# Patient Record
Sex: FEMALE | Race: BLACK OR AFRICAN AMERICAN | Employment: UNEMPLOYED | URBAN - METROPOLITAN AREA
[De-identification: names, ages, dates, MRNs, and addresses within clinical notes are randomized per-mention and may not be internally consistent; named-entity substitution may affect disease eponyms.]

---

## 2020-12-21 ENCOUNTER — HOSPITAL ENCOUNTER (OUTPATIENT)
Facility: CLINIC | Age: 40
Setting detail: OBSERVATION
Discharge: HOME | End: 2020-12-22
Attending: EMERGENCY MEDICINE | Admitting: INTERNAL MEDICINE
Payer: MEDICAID

## 2020-12-21 DIAGNOSIS — D64.9 ANEMIA, UNSPECIFIED TYPE: ICD-10-CM

## 2020-12-21 DIAGNOSIS — J02.9 SORE THROAT: ICD-10-CM

## 2020-12-21 DIAGNOSIS — Z20.828 CONTACT WITH AND (SUSPECTED) EXPOSURE TO OTHER VIRAL COMMUNICABLE DISEASES: ICD-10-CM

## 2020-12-21 LAB
ABO + RH BLD: NORMAL
ABO + RH BLD: NORMAL
ALBUMIN SERPL-MCNC: 3.3 G/DL (ref 3.4–5)
ALBUMIN UR-MCNC: NEGATIVE MG/DL
ALP SERPL-CCNC: 61 U/L (ref 40–150)
ALT SERPL W P-5'-P-CCNC: 16 U/L (ref 0–50)
ANION GAP SERPL CALCULATED.3IONS-SCNC: 6 MMOL/L (ref 3–14)
ANISOCYTOSIS BLD QL SMEAR: ABNORMAL
APPEARANCE UR: CLEAR
APTT PPP: 33 SEC (ref 22–37)
AST SERPL W P-5'-P-CCNC: 33 U/L (ref 0–45)
BACTERIA #/AREA URNS HPF: ABNORMAL /HPF
BASOPHILS # BLD AUTO: 0.1 10E9/L (ref 0–0.2)
BASOPHILS NFR BLD AUTO: 1.7 %
BILIRUB SERPL-MCNC: 0.6 MG/DL (ref 0.2–1.3)
BILIRUB UR QL STRIP: NEGATIVE
BLD GP AB SCN SERPL QL: NORMAL
BLD PROD TYP BPU: NORMAL
BLD UNIT ID BPU: 0
BLD UNIT ID BPU: 0
BLOOD BANK CMNT PATIENT-IMP: NORMAL
BLOOD PRODUCT CODE: NORMAL
BLOOD PRODUCT CODE: NORMAL
BPU ID: NORMAL
BPU ID: NORMAL
BUN SERPL-MCNC: 5 MG/DL (ref 7–30)
CALCIUM SERPL-MCNC: 8.2 MG/DL (ref 8.5–10.1)
CHLORIDE SERPL-SCNC: 105 MMOL/L (ref 94–109)
CO2 SERPL-SCNC: 25 MMOL/L (ref 20–32)
COLOR UR AUTO: ABNORMAL
CREAT SERPL-MCNC: 0.38 MG/DL (ref 0.52–1.04)
DACRYOCYTES BLD QL SMEAR: ABNORMAL
DEPRECATED S PYO AG THROAT QL EIA: NEGATIVE
DIFFERENTIAL METHOD BLD: ABNORMAL
DIFFERENTIAL METHOD BLD: ABNORMAL
ELLIPTOCYTES BLD QL SMEAR: ABNORMAL
EOSINOPHIL # BLD AUTO: 0 10E9/L (ref 0–0.7)
EOSINOPHIL # BLD AUTO: 0.1 10E9/L (ref 0–0.7)
EOSINOPHIL NFR BLD AUTO: 0.9 %
EOSINOPHIL NFR BLD AUTO: 2 %
ERYTHROCYTE [DISTWIDTH] IN BLOOD BY AUTOMATED COUNT: 22.6 % (ref 10–15)
ERYTHROCYTE [DISTWIDTH] IN BLOOD BY AUTOMATED COUNT: 23 % (ref 10–15)
FERRITIN SERPL-MCNC: 2 NG/ML (ref 12–150)
FLUABV+SARS-COV-2+RSV PNL RESP NAA+PROBE: NEGATIVE
FLUABV+SARS-COV-2+RSV PNL RESP NAA+PROBE: NEGATIVE
FOLATE SERPL-MCNC: 6.7 NG/ML
GFR SERPL CREATININE-BSD FRML MDRD: >90 ML/MIN/{1.73_M2}
GLUCOSE SERPL-MCNC: 93 MG/DL (ref 70–99)
GLUCOSE UR STRIP-MCNC: NEGATIVE MG/DL
HCG SERPL QL: NEGATIVE
HCT VFR BLD AUTO: 20.7 % (ref 35–47)
HCT VFR BLD AUTO: 21.9 % (ref 35–47)
HGB BLD-MCNC: 5.1 G/DL (ref 11.7–15.7)
HGB BLD-MCNC: 5.4 G/DL (ref 11.7–15.7)
HGB UR QL STRIP: NEGATIVE
HYPOCHROMIA BLD QL: PRESENT
INR PPP: 1.23 (ref 0.86–1.14)
IRON SATN MFR SERPL: 2 % (ref 15–46)
IRON SERPL-MCNC: 7 UG/DL (ref 35–180)
KETONES UR STRIP-MCNC: NEGATIVE MG/DL
LABORATORY COMMENT REPORT: NORMAL
LDH SERPL L TO P-CCNC: 156 U/L (ref 81–234)
LEUKOCYTE ESTERASE UR QL STRIP: NEGATIVE
LYMPHOCYTES # BLD AUTO: 1.6 10E9/L (ref 0.8–5.3)
LYMPHOCYTES # BLD AUTO: 2 10E9/L (ref 0.8–5.3)
LYMPHOCYTES NFR BLD AUTO: 44 %
LYMPHOCYTES NFR BLD AUTO: 44.4 %
MCH RBC QN AUTO: 15.4 PG (ref 26.5–33)
MCH RBC QN AUTO: 15.4 PG (ref 26.5–33)
MCHC RBC AUTO-ENTMCNC: 24.6 G/DL (ref 31.5–36.5)
MCHC RBC AUTO-ENTMCNC: 24.7 G/DL (ref 31.5–36.5)
MCV RBC AUTO: 62 FL (ref 78–100)
MCV RBC AUTO: 62 FL (ref 78–100)
MICROCYTES BLD QL SMEAR: PRESENT
MONOCYTES # BLD AUTO: 0.4 10E9/L (ref 0–1.3)
MONOCYTES # BLD AUTO: 0.4 10E9/L (ref 0–1.3)
MONOCYTES NFR BLD AUTO: 11 %
MONOCYTES NFR BLD AUTO: 8.7 %
MYELOCYTES # BLD: 0.1 10E9/L
MYELOCYTES NFR BLD MANUAL: 1.7 %
NEUTROPHILS # BLD AUTO: 1.6 10E9/L (ref 1.6–8.3)
NEUTROPHILS # BLD AUTO: 2 10E9/L (ref 1.6–8.3)
NEUTROPHILS NFR BLD AUTO: 42.6 %
NEUTROPHILS NFR BLD AUTO: 43 %
NITRATE UR QL: NEGATIVE
NRBC # BLD AUTO: 0 10*3/UL
NRBC BLD AUTO-RTO: 1 /100
NUM BPU REQUESTED: 2
PH UR STRIP: 5.5 PH (ref 5–7)
PLATELET # BLD AUTO: 273 10E9/L (ref 150–450)
PLATELET # BLD AUTO: 320 10E9/L (ref 150–450)
PLATELET # BLD EST: ABNORMAL 10*3/UL
POIKILOCYTOSIS BLD QL SMEAR: ABNORMAL
POTASSIUM SERPL-SCNC: 4.1 MMOL/L (ref 3.4–5.3)
PROT SERPL-MCNC: 9.9 G/DL (ref 6.8–8.8)
RBC # BLD AUTO: 3.32 10E12/L (ref 3.8–5.2)
RBC # BLD AUTO: 3.51 10E12/L (ref 3.8–5.2)
RBC #/AREA URNS AUTO: 5 /HPF (ref 0–2)
RBC INCLUSIONS BLD: SLIGHT
RETICS # AUTO: 39.8 10E9/L (ref 25–95)
RETICS/RBC NFR AUTO: 1.2 % (ref 0.5–2)
RSV RNA SPEC QL NAA+PROBE: NORMAL
SARS-COV-2 RNA SPEC QL NAA+PROBE: NEGATIVE
SODIUM SERPL-SCNC: 136 MMOL/L (ref 133–144)
SOURCE: ABNORMAL
SP GR UR STRIP: 1 (ref 1–1.03)
SPECIMEN EXP DATE BLD: NORMAL
SPECIMEN SOURCE: NORMAL
SPERM #/AREA URNS HPF: PRESENT /HPF
SQUAMOUS #/AREA URNS AUTO: 6 /HPF (ref 0–1)
STREP GROUP A PCR: NOT DETECTED
TIBC SERPL-MCNC: 371 UG/DL (ref 240–430)
TRANSFERRIN SERPL-MCNC: 278 MG/DL (ref 210–360)
TRANSFUSION STATUS PATIENT QL: NORMAL
TSH SERPL DL<=0.005 MIU/L-ACNC: 1.43 MU/L (ref 0.4–4)
UROBILINOGEN UR STRIP-MCNC: NORMAL MG/DL (ref 0–2)
VIT B12 SERPL-MCNC: 308 PG/ML (ref 193–986)
WBC # BLD AUTO: 3.7 10E9/L (ref 4–11)
WBC # BLD AUTO: 4.6 10E9/L (ref 4–11)
WBC #/AREA URNS AUTO: 1 /HPF (ref 0–5)

## 2020-12-21 PROCEDURE — P9016 RBC LEUKOCYTES REDUCED: HCPCS | Performed by: EMERGENCY MEDICINE

## 2020-12-21 PROCEDURE — 99285 EMERGENCY DEPT VISIT HI MDM: CPT | Performed by: EMERGENCY MEDICINE

## 2020-12-21 PROCEDURE — 85610 PROTHROMBIN TIME: CPT | Performed by: EMERGENCY MEDICINE

## 2020-12-21 PROCEDURE — 93010 ELECTROCARDIOGRAM REPORT: CPT | Performed by: EMERGENCY MEDICINE

## 2020-12-21 PROCEDURE — 86900 BLOOD TYPING SEROLOGIC ABO: CPT | Performed by: EMERGENCY MEDICINE

## 2020-12-21 PROCEDURE — 999N001174 HC STATISTIC STREP A RAPID: Performed by: EMERGENCY MEDICINE

## 2020-12-21 PROCEDURE — 85060 BLOOD SMEAR INTERPRETATION: CPT | Performed by: PATHOLOGY

## 2020-12-21 PROCEDURE — 96361 HYDRATE IV INFUSION ADD-ON: CPT | Performed by: EMERGENCY MEDICINE

## 2020-12-21 PROCEDURE — 84466 ASSAY OF TRANSFERRIN: CPT | Performed by: EMERGENCY MEDICINE

## 2020-12-21 PROCEDURE — 83010 ASSAY OF HAPTOGLOBIN QUANT: CPT | Performed by: EMERGENCY MEDICINE

## 2020-12-21 PROCEDURE — 85730 THROMBOPLASTIN TIME PARTIAL: CPT | Performed by: EMERGENCY MEDICINE

## 2020-12-21 PROCEDURE — C9803 HOPD COVID-19 SPEC COLLECT: HCPCS | Performed by: EMERGENCY MEDICINE

## 2020-12-21 PROCEDURE — G0378 HOSPITAL OBSERVATION PER HR: HCPCS

## 2020-12-21 PROCEDURE — 82746 ASSAY OF FOLIC ACID SERUM: CPT | Performed by: EMERGENCY MEDICINE

## 2020-12-21 PROCEDURE — 250N000013 HC RX MED GY IP 250 OP 250 PS 637: Performed by: EMERGENCY MEDICINE

## 2020-12-21 PROCEDURE — 99219 PR INITIAL OBSERVATION CARE,LEVEL II: CPT | Performed by: INTERNAL MEDICINE

## 2020-12-21 PROCEDURE — 83540 ASSAY OF IRON: CPT | Performed by: EMERGENCY MEDICINE

## 2020-12-21 PROCEDURE — 999N001086 HC STATISTIC MORPHOLOGY W/INTERP HEMEPATH TC 85060: Performed by: EMERGENCY MEDICINE

## 2020-12-21 PROCEDURE — 83615 LACTATE (LD) (LDH) ENZYME: CPT | Performed by: EMERGENCY MEDICINE

## 2020-12-21 PROCEDURE — 86901 BLOOD TYPING SEROLOGIC RH(D): CPT | Performed by: EMERGENCY MEDICINE

## 2020-12-21 PROCEDURE — 86923 COMPATIBILITY TEST ELECTRIC: CPT | Performed by: EMERGENCY MEDICINE

## 2020-12-21 PROCEDURE — 85025 COMPLETE CBC W/AUTO DIFF WBC: CPT | Mod: 91 | Performed by: EMERGENCY MEDICINE

## 2020-12-21 PROCEDURE — 84443 ASSAY THYROID STIM HORMONE: CPT | Performed by: EMERGENCY MEDICINE

## 2020-12-21 PROCEDURE — 93005 ELECTROCARDIOGRAM TRACING: CPT | Performed by: EMERGENCY MEDICINE

## 2020-12-21 PROCEDURE — 82728 ASSAY OF FERRITIN: CPT | Performed by: EMERGENCY MEDICINE

## 2020-12-21 PROCEDURE — 83550 IRON BINDING TEST: CPT | Performed by: EMERGENCY MEDICINE

## 2020-12-21 PROCEDURE — 87636 SARSCOV2 & INF A&B AMP PRB: CPT | Performed by: EMERGENCY MEDICINE

## 2020-12-21 PROCEDURE — 96360 HYDRATION IV INFUSION INIT: CPT | Performed by: EMERGENCY MEDICINE

## 2020-12-21 PROCEDURE — 99285 EMERGENCY DEPT VISIT HI MDM: CPT | Mod: 25 | Performed by: EMERGENCY MEDICINE

## 2020-12-21 PROCEDURE — 250N000009 HC RX 250: Performed by: EMERGENCY MEDICINE

## 2020-12-21 PROCEDURE — 84134 ASSAY OF PREALBUMIN: CPT | Performed by: EMERGENCY MEDICINE

## 2020-12-21 PROCEDURE — 86850 RBC ANTIBODY SCREEN: CPT | Performed by: EMERGENCY MEDICINE

## 2020-12-21 PROCEDURE — 87651 STREP A DNA AMP PROBE: CPT | Performed by: EMERGENCY MEDICINE

## 2020-12-21 PROCEDURE — 258N000003 HC RX IP 258 OP 636: Performed by: EMERGENCY MEDICINE

## 2020-12-21 PROCEDURE — 82607 VITAMIN B-12: CPT | Performed by: EMERGENCY MEDICINE

## 2020-12-21 PROCEDURE — 85045 AUTOMATED RETICULOCYTE COUNT: CPT | Performed by: EMERGENCY MEDICINE

## 2020-12-21 PROCEDURE — 80053 COMPREHEN METABOLIC PANEL: CPT | Performed by: EMERGENCY MEDICINE

## 2020-12-21 PROCEDURE — 81001 URINALYSIS AUTO W/SCOPE: CPT | Performed by: EMERGENCY MEDICINE

## 2020-12-21 PROCEDURE — 36430 TRANSFUSION BLD/BLD COMPNT: CPT | Performed by: EMERGENCY MEDICINE

## 2020-12-21 PROCEDURE — 84703 CHORIONIC GONADOTROPIN ASSAY: CPT | Performed by: EMERGENCY MEDICINE

## 2020-12-21 RX ORDER — POLYETHYLENE GLYCOL 3350 17 G/17G
17 POWDER, FOR SOLUTION ORAL DAILY
Status: DISCONTINUED | OUTPATIENT
Start: 2020-12-22 | End: 2020-12-22 | Stop reason: HOSPADM

## 2020-12-21 RX ORDER — SODIUM CHLORIDE 9 MG/ML
INJECTION, SOLUTION INTRAVENOUS
Status: DISCONTINUED
Start: 2020-12-21 | End: 2020-12-21 | Stop reason: HOSPADM

## 2020-12-21 RX ORDER — ACETAMINOPHEN 325 MG/1
650 TABLET ORAL EVERY 6 HOURS PRN
Status: ON HOLD | COMMUNITY
End: 2020-12-22

## 2020-12-21 RX ORDER — ACETAMINOPHEN 650 MG/1
650 SUPPOSITORY RECTAL EVERY 4 HOURS PRN
Status: DISCONTINUED | OUTPATIENT
Start: 2020-12-21 | End: 2020-12-22 | Stop reason: HOSPADM

## 2020-12-21 RX ORDER — DEXTROSE MONOHYDRATE, SODIUM CHLORIDE, AND POTASSIUM CHLORIDE 50; 1.49; 9 G/1000ML; G/1000ML; G/1000ML
INJECTION, SOLUTION INTRAVENOUS CONTINUOUS
Status: DISPENSED | OUTPATIENT
Start: 2020-12-21 | End: 2020-12-22

## 2020-12-21 RX ORDER — SODIUM CHLORIDE 9 MG/ML
INJECTION, SOLUTION INTRAVENOUS CONTINUOUS
Status: DISCONTINUED | OUTPATIENT
Start: 2020-12-21 | End: 2020-12-22 | Stop reason: HOSPADM

## 2020-12-21 RX ORDER — ACETAMINOPHEN 325 MG/1
650 TABLET ORAL EVERY 4 HOURS PRN
Status: DISCONTINUED | OUTPATIENT
Start: 2020-12-21 | End: 2020-12-22 | Stop reason: HOSPADM

## 2020-12-21 RX ORDER — ONDANSETRON 4 MG/1
4 TABLET, ORALLY DISINTEGRATING ORAL EVERY 6 HOURS PRN
Status: DISCONTINUED | OUTPATIENT
Start: 2020-12-21 | End: 2020-12-22 | Stop reason: HOSPADM

## 2020-12-21 RX ORDER — ACETAMINOPHEN 500 MG
1000 TABLET ORAL ONCE
Status: COMPLETED | OUTPATIENT
Start: 2020-12-21 | End: 2020-12-21

## 2020-12-21 RX ORDER — AMOXICILLIN 250 MG
2 CAPSULE ORAL 2 TIMES DAILY
Status: DISCONTINUED | OUTPATIENT
Start: 2020-12-21 | End: 2020-12-22 | Stop reason: HOSPADM

## 2020-12-21 RX ORDER — ACETAMINOPHEN 325 MG/1
650 TABLET ORAL EVERY 6 HOURS PRN
Status: DISCONTINUED | OUTPATIENT
Start: 2020-12-21 | End: 2020-12-21

## 2020-12-21 RX ORDER — AMOXICILLIN 250 MG
1 CAPSULE ORAL 2 TIMES DAILY
Status: DISCONTINUED | OUTPATIENT
Start: 2020-12-21 | End: 2020-12-22 | Stop reason: HOSPADM

## 2020-12-21 RX ORDER — ONDANSETRON 2 MG/ML
4 INJECTION INTRAMUSCULAR; INTRAVENOUS EVERY 6 HOURS PRN
Status: DISCONTINUED | OUTPATIENT
Start: 2020-12-21 | End: 2020-12-22 | Stop reason: HOSPADM

## 2020-12-21 RX ADMIN — ACETAMINOPHEN 1000 MG: 500 TABLET ORAL at 14:49

## 2020-12-21 RX ADMIN — SODIUM CHLORIDE 1000 ML: 9 INJECTION, SOLUTION INTRAVENOUS at 15:53

## 2020-12-21 RX ADMIN — SODIUM CHLORIDE: 9 INJECTION, SOLUTION INTRAVENOUS at 18:32

## 2020-12-21 RX ADMIN — LIDOCAINE HYDROCHLORIDE 15 ML: 20 SOLUTION ORAL; TOPICAL at 14:49

## 2020-12-21 ASSESSMENT — MIFFLIN-ST. JEOR: SCORE: 1125.41

## 2020-12-21 NOTE — PHARMACY-ADMISSION MEDICATION HISTORY
Admission medication history interview status for the 12/21/2020 admission is complete. See Epic admission navigator for allergy information, pharmacy, prior to admission medications and immunization status.     Medication history interview sources:  Patient interview    Changes made to PTA medication list (reason)  Added: Tylenol PRN  Deleted: None  Changed: None    Additional medication history information (including reliability of information, actions taken by pharmacist): None    Prior to Admission medications    Medication Sig Last Dose Taking? Auth Provider   acetaminophen (TYLENOL) 325 MG tablet Take 650 mg by mouth every 6 hours as needed for mild pain 12/20/2020 Yes Unknown, Entered By History     Medication history completed by:   Lorena Khan, PharmD, BCPS  Clinical Pharmacist - ED, Ascom *[id number]

## 2020-12-21 NOTE — ED PROVIDER NOTES
"    [facility] [place] EMERGENCY DEPARTMENT ([facility])   12/21/20      History     Chief Complaint   Patient presents with     Pharyngitis     Chronic intermittent sore throat x 6 months, patient clims its difficult to swallow, subjective fever     HPI  Kajal Allen is a 40 year old female who presents to the Emergency Department for evaluation of a sore throat.  Patient states that this sore throat has been ongoing for 6 months, and \"comes and goes\".  She is unsure how long this most recent episode has been ongoing for.  The patient also endorses headache, and subjective fevers.  Patient states that she has been feeling feverish for 1 day.  She also endorses pain with swallowing that has been ongoing for 3 months.  She states that she has been attempting to drink plenty of water, and eat soft food.  Patient states that she took Tylenol yesterday, but none today before coming in for evaluation.  Patient states that she has lost her sense of taste whenever she eats food.  She states this has been ongoing for 4-5 days.  Patient denies any exposure to COVID-19. She lives alone and reports she is not currently working.  She denies any abdominal pain, nausea, vomiting, or diarrhea.  Denies any dental pain. No cough.  Patient states that she has been fatigued over the past 1 week as well. She states she has heavy menstrual periods with last being 10 days ago. She denies any other bleeding, no hematochezia or melena. No current vaginal bleeding. No dysuria, hematuria, chest pain, or shortness of breath. No recent falls or trauma. She denies being on any anticoagulation.     I have reviewed the Medications, Allergies, Past Medical and Surgical History, and Social History in the [employer] system.  PAST MEDICAL HISTORY: History reviewed. No pertinent past medical history.    PAST SURGICAL HISTORY: History reviewed. No pertinent surgical history.    Past medical history, past surgical history, medications, and allergies were reviewed " "with the patient. Additional pertinent items: None    FAMILY HISTORY: History reviewed. No pertinent family history.    SOCIAL HISTORY:   Social History     Tobacco Use     Smoking status: Never Smoker     Smokeless tobacco: Never Used   Substance Use Topics     Alcohol use: Not Currently     Social history was reviewed with the patient. Additional pertinent items: None      Patient's Medications    No medications on file        No Known Allergies     Review of Systems  Pertinent positives and negatives are documented in the HPI. All other systems reviewed and are negative.    Physical Exam   BP: 120/74  Pulse: 104  Temp: 97.7  F (36.5  C)  Resp: 16  Height: 154.9 cm (5' 1\")  Weight: 50.8 kg (112 lb)  SpO2: 97 %      Physical Exam  Vitals signs and nursing note reviewed.   Constitutional:       General: She is not in acute distress.     Appearance: She is well-developed.   HENT:      Head: Normocephalic and atraumatic.      Nose: Nose normal.      Mouth/Throat:      Mouth: Mucous membranes are moist.      Pharynx: No oropharyngeal exudate or posterior oropharyngeal erythema.      Comments: No erythema. Uvula is midline. No posterior oropharynx swelling. No trismus. Voice is normal. No exudate  Eyes:      Extraocular Movements: Extraocular movements intact.      Conjunctiva/sclera: Conjunctivae normal.      Pupils: Pupils are equal, round, and reactive to light.   Neck:      Musculoskeletal: Normal range of motion and neck supple. No neck rigidity.   Cardiovascular:      Rate and Rhythm: Regular rhythm. Tachycardia present.      Pulses: Normal pulses.      Heart sounds: Normal heart sounds. No murmur.      Comments: Mild tachycardia  Pulmonary:      Effort: Pulmonary effort is normal. No respiratory distress.      Breath sounds: Normal breath sounds. No wheezing or rales.   Abdominal:      General: Bowel sounds are normal. There is no distension.      Palpations: Abdomen is soft. There is no mass.      Tenderness: " There is no abdominal tenderness. There is no guarding or rebound.   Musculoskeletal: Normal range of motion.         General: No swelling or tenderness.   Lymphadenopathy:      Cervical: No cervical adenopathy.   Skin:     General: Skin is warm and dry.      Capillary Refill: Capillary refill takes less than 2 seconds.      Findings: No rash.   Neurological:      General: No focal deficit present.      Mental Status: She is alert and oriented to person, place, and time.      GCS: GCS eye subscore is 4. GCS verbal subscore is 5. GCS motor subscore is 6.      Cranial Nerves: No cranial nerve deficit.      Sensory: No sensory deficit.      Motor: No weakness.   Psychiatric:         Mood and Affect: Mood normal.         ED Course   2:41 PM  The patient was seen and examined by Phoebe Martinez MD in Room ED18.        Procedures                   Labs Ordered and Resulted from Time of ED Arrival Up to the Time of Departure from the ED   COMPREHENSIVE METABOLIC PANEL - Abnormal; Notable for the following components:       Result Value    Urea Nitrogen 5 (*)     Creatinine 0.38 (*)     Calcium 8.2 (*)     Albumin 3.3 (*)     Protein Total 9.9 (*)     All other components within normal limits   CBC WITH PLATELETS DIFFERENTIAL - Abnormal; Notable for the following components:    RBC Count 3.51 (*)     Hemoglobin 5.4 (*)     Hematocrit 21.9 (*)     MCV 62 (*)     MCH 15.4 (*)     MCHC 24.7 (*)     RDW 23.0 (*)     Nucleated RBCs 1 (*)     Absolute Myelocytes 0.1 (*)     All other components within normal limits   ROUTINE UA WITH MICROSCOPIC - Abnormal; Notable for the following components:    RBC Urine 5 (*)     Bacteria Urine Few (*)     Squamous Epithelial /HPF Urine 6 (*)     sperm Present (*)     All other components within normal limits   IRON AND IRON BINDING CAPACITY - Abnormal; Notable for the following components:    Iron 7 (*)     Iron Saturation Index 2 (*)     All other components within normal limits   INR -  Abnormal; Notable for the following components:    INR 1.23 (*)     All other components within normal limits   CBC WITH PLATELETS DIFFERENTIAL - Abnormal; Notable for the following components:    WBC 3.7 (*)     RBC Count 3.32 (*)     Hemoglobin 5.1 (*)     Hematocrit 20.7 (*)     MCV 62 (*)     MCH 15.4 (*)     MCHC 24.6 (*)     RDW 22.6 (*)     All other components within normal limits   FERRITIN - Abnormal; Notable for the following components:    Ferritin 2 (*)     All other components within normal limits   INFLUENZA A/B & SARS-COV2 PCR MULTIPLEX   HCG QUALITATIVE   TSH WITH FREE T4 REFLEX   LACTATE DEHYDROGENASE   RETICULOCYTE COUNT   PARTIAL THROMBOPLASTIN TIME   ABO/RH TYPE AND SCREEN   RED BLOOD CELL PREPARE ORDER UNIT   STREPTOCOCCUS A RAPID SCR W REFLX TO PCR   GROUP A STREPTOCOCCUS PCR THROAT SWAB       Medications   dextrose 5% and 0.9% NaCl with potassium chloride 20 mEq infusion ( Intravenous Stopped 12/22/20 1059)   lidocaine (XYLOCAINE) 2 % 15 mL, alum & mag hydroxide-simethicone (MAALOX) 15 mL GI Cocktail (15 mLs Oral Given 12/21/20 1449)   acetaminophen (TYLENOL) tablet 1,000 mg (1,000 mg Oral Given 12/21/20 1449)   0.9% sodium chloride BOLUS (0 mLs Intravenous Stopped 12/21/20 1630)             Assessments & Plan (with Medical Decision Making)   Patient presents with report of fatigue and sore throat intermittently occurring over the past 6 months.  She does not have insurance or a primary care doctor and she reported feeling somewhat worse with a subjective fever over the last couple of days and thus presented here.  Vital signs reveal mild tachycardia 104 bpm with the remainder of her vital signs being within normal limits.  She is afebrile.  No signs of respiratory distress.  She denies any chest pain or shortness of breath.  On exam she is in no acute distress.  Differential diagnosis is broad with her multiple vague chronic ongoing complaints and includes strep pharyngitis, viral syndrome,  multiple intermittent viral illnesses, UTI, anemia, thyroid dysfunction, COVID-19, potential other vitamin deficiency.  Laboratory studies were initiated.  Also considered epiglottitis, retropharyngeal abscess, or peritonsillar abscess however on exam has no findings in these and felt this would be very unlikely.  She also did not have any obvious cervical lymphadenopathy on my exam.  No trismus.  Normal range of motion of the neck and no signs of meningismus.  She was given IV fluids as well as oral Tylenol.    Rapid strep was negative.  COVID-19 PCR is pending.  Urinalysis is ordered and pending.  Blood qualitative pregnancy is negative.  TSH is within normal limits.  CMP is largely unremarkable.CBC reveals a white blood cell count 4.6 with a hemoglobin of 5.4 and platelets of 273.  Do feel that this may be the cause of her overall fatigue.  Still somewhat uncertain what is the cause of her soreness in her throat.  Type and screen was obtained as well as iron studies, coagulation studies, LDH, haptoglobin, reticulocyte count prior to blood transfusion. Will transfuse 2 units of PRBCs. Blood consent obtained. Patient reports no active bleeding currently.  She does have some heavy menstrual periods but has not had this for 10 days.  Exact cause of her anemia is unknown at this time and do feel she warrants admission for further work-up as she has no outpatient follow-up and no insurance and has a significantly low hemoglobin here.  Spoke with the triage physician as well as the [facility] hospitalist who accepted the patient for admission.  Patient was in agreement with this plan.  Patient was admitted to the hospitalist service in stable condition.    EKG was ordered upon sign out. Dr. Otoole will follow up on this.     I have reviewed the nursing notes.    I have reviewed the findings, diagnosis, plan and need for follow up with the patient.    New Prescriptions    No medications on file       Final diagnoses:    Anemia, unspecified type   Sore throat   I, Srini Delgado, am serving as a trained medical scribe to document services personally performed by Phoebe Martinez MD, based on the provider's statements to me.      IPhoebe MD, was physically present and have reviewed and verified the accuracy of this note documented by Srini Delgado.      12/21/2020   [facility] EMERGENCY DEPARTMENT     Phoebe Martinez MD  12/26/20 1053

## 2020-12-22 ENCOUNTER — PATIENT OUTREACH (OUTPATIENT)
Dept: CARE COORDINATION | Facility: CLINIC | Age: 40
End: 2020-12-22

## 2020-12-22 VITALS
HEIGHT: 61 IN | WEIGHT: 112 LBS | HEART RATE: 87 BPM | TEMPERATURE: 97.1 F | DIASTOLIC BLOOD PRESSURE: 49 MMHG | RESPIRATION RATE: 20 BRPM | OXYGEN SATURATION: 100 % | SYSTOLIC BLOOD PRESSURE: 106 MMHG | BODY MASS INDEX: 21.14 KG/M2

## 2020-12-22 LAB
ALBUMIN SERPL-MCNC: 2.9 G/DL (ref 3.4–5)
ALP SERPL-CCNC: 51 U/L (ref 40–150)
ALT SERPL W P-5'-P-CCNC: 11 U/L (ref 0–50)
ANION GAP SERPL CALCULATED.3IONS-SCNC: 6 MMOL/L (ref 3–14)
AST SERPL W P-5'-P-CCNC: 15 U/L (ref 0–45)
BASOPHILS # BLD AUTO: 0 10E9/L (ref 0–0.2)
BASOPHILS NFR BLD AUTO: 0.2 %
BILIRUB SERPL-MCNC: 1.4 MG/DL (ref 0.2–1.3)
BUN SERPL-MCNC: 4 MG/DL (ref 7–30)
CALCIUM SERPL-MCNC: 7.9 MG/DL (ref 8.5–10.1)
CHLORIDE SERPL-SCNC: 111 MMOL/L (ref 94–109)
CO2 SERPL-SCNC: 23 MMOL/L (ref 20–32)
COPATH REPORT: NORMAL
CREAT SERPL-MCNC: 0.38 MG/DL (ref 0.52–1.04)
DIFFERENTIAL METHOD BLD: ABNORMAL
EOSINOPHIL # BLD AUTO: 0.1 10E9/L (ref 0–0.7)
EOSINOPHIL NFR BLD AUTO: 1.6 %
ERYTHROCYTE [DISTWIDTH] IN BLOOD BY AUTOMATED COUNT: 27.9 % (ref 10–15)
GFR SERPL CREATININE-BSD FRML MDRD: >90 ML/MIN/{1.73_M2}
GLUCOSE SERPL-MCNC: 97 MG/DL (ref 70–99)
HAPTOGLOB SERPL-MCNC: 111 MG/DL (ref 32–197)
HCT VFR BLD AUTO: 26.8 % (ref 35–47)
HGB BLD-MCNC: 7.5 G/DL (ref 11.7–15.7)
HGB BLD-MCNC: 7.6 G/DL (ref 11.7–15.7)
HGB BLD-MCNC: 8 G/DL (ref 11.7–15.7)
IMM GRANULOCYTES # BLD: 0 10E9/L (ref 0–0.4)
IMM GRANULOCYTES NFR BLD: 0.4 %
INTERPRETATION ECG - MUSE: NORMAL
LACTATE BLD-SCNC: 1.4 MMOL/L (ref 0.7–2)
LYMPHOCYTES # BLD AUTO: 1.7 10E9/L (ref 0.8–5.3)
LYMPHOCYTES NFR BLD AUTO: 30.6 %
MCH RBC QN AUTO: 19.8 PG (ref 26.5–33)
MCHC RBC AUTO-ENTMCNC: 28.4 G/DL (ref 31.5–36.5)
MCV RBC AUTO: 70 FL (ref 78–100)
MONOCYTES # BLD AUTO: 0.6 10E9/L (ref 0–1.3)
MONOCYTES NFR BLD AUTO: 10.6 %
NEUTROPHILS # BLD AUTO: 3.2 10E9/L (ref 1.6–8.3)
NEUTROPHILS NFR BLD AUTO: 56.6 %
NRBC # BLD AUTO: 0 10*3/UL
NRBC BLD AUTO-RTO: 0 /100
PLATELET # BLD AUTO: 298 10E9/L (ref 150–450)
POTASSIUM SERPL-SCNC: 3.9 MMOL/L (ref 3.4–5.3)
PREALB SERPL IA-MCNC: 17 MG/DL (ref 15–45)
PROT SERPL-MCNC: 8.4 G/DL (ref 6.8–8.8)
RBC # BLD AUTO: 3.83 10E12/L (ref 3.8–5.2)
SODIUM SERPL-SCNC: 140 MMOL/L (ref 133–144)
WBC # BLD AUTO: 5.6 10E9/L (ref 4–11)

## 2020-12-22 PROCEDURE — 99217 PR OBSERVATION CARE DISCHARGE: CPT | Performed by: INTERNAL MEDICINE

## 2020-12-22 PROCEDURE — 36415 COLL VENOUS BLD VENIPUNCTURE: CPT | Performed by: INTERNAL MEDICINE

## 2020-12-22 PROCEDURE — 83605 ASSAY OF LACTIC ACID: CPT | Performed by: INTERNAL MEDICINE

## 2020-12-22 PROCEDURE — G0378 HOSPITAL OBSERVATION PER HR: HCPCS

## 2020-12-22 PROCEDURE — 85025 COMPLETE CBC W/AUTO DIFF WBC: CPT | Performed by: INTERNAL MEDICINE

## 2020-12-22 PROCEDURE — 80053 COMPREHEN METABOLIC PANEL: CPT | Performed by: INTERNAL MEDICINE

## 2020-12-22 PROCEDURE — 85018 HEMOGLOBIN: CPT | Mod: 91 | Performed by: INTERNAL MEDICINE

## 2020-12-22 PROCEDURE — 250N000013 HC RX MED GY IP 250 OP 250 PS 637: Performed by: INTERNAL MEDICINE

## 2020-12-22 PROCEDURE — 96361 HYDRATE IV INFUSION ADD-ON: CPT

## 2020-12-22 PROCEDURE — 258N000001 HC RX 258: Performed by: INTERNAL MEDICINE

## 2020-12-22 RX ORDER — FERROUS SULFATE 325(65) MG
325 TABLET, DELAYED RELEASE (ENTERIC COATED) ORAL EVERY OTHER DAY
Qty: 300 TABLET | Refills: 0 | Status: SHIPPED | OUTPATIENT
Start: 2020-12-22

## 2020-12-22 RX ADMIN — POTASSIUM CHLORIDE, DEXTROSE MONOHYDRATE AND SODIUM CHLORIDE: 150; 5; 900 INJECTION, SOLUTION INTRAVENOUS at 00:48

## 2020-12-22 RX ADMIN — DOCUSATE SODIUM AND SENNOSIDES 1 TABLET: 8.6; 5 TABLET ORAL at 08:15

## 2020-12-22 NOTE — CONSULTS
Care Management Initial Consult    General Information  Assessment completed with: Patient, Kajal  Type of CM/SW Visit: Initial Assessment    Primary Care Provider verified and updated as needed:     Readmission within the last 30 days: no previous admission in last 30 days      Reason for Consult: discharge planning  Advance Care Planning:          Communication Assessment  Patient's communication style: spoken language (English or Bilingual)    Hearing Difficulty or Deaf: no      Cognitive  Cognitive/Neuro/Behavioral: WDL                      Living Environment:   People in home: alone     Current living Arrangements: apartment      Able to return to prior arrangements: yes     Family/Social Support:  Care provided by: self  Provides care for: no one  Marital Status: Single  Sibling(s)          Description of Support System: Supportive       Current Resources:   Skilled Home Care Services:    Community Resources: None  Equipment currently used at home: none  Supplies currently used at home: None    Employment/Financial:  Employment Status:  No concerns        Financial Concerns:   No concerns     Lifestyle & Psychosocial Needs:        Socioeconomic History     Marital status: Single     Spouse name: Not on file     Number of children: Not on file     Years of education: Not on file     Highest education level: Not on file     Tobacco Use     Smoking status: Never Smoker     Smokeless tobacco: Never Used   Substance and Sexual Activity     Alcohol use: Not Currently     Drug use: Not Currently       Functional Status:  Prior to admission patient needed assistance: None    Mental Health Status:  Mental Health Status: No Current Concerns       Chemical Dependency Status:  Chemical Dependency Status: No Current Concerns          Values/Beliefs:  Spiritual, Cultural Beliefs, Shinto Practices, Values that affect care:   No              Additional Information:  Per MD team, patient is ready for discharge today. Met  with patient to discuss. She reports living alone but had adequate support. Her brother will pick her up today when she is ready for discharge. No new discharge need noted.    Ab Perez RN Care Coordinator [phone]

## 2020-12-22 NOTE — PLAN OF CARE
VS: Soft BPs. Blood infusing. Fluid bolus given in ED. Denies CP and dizziness. Other VSS. A/O x4.    O2: >90% on RA. Denies SOB, lung sounds clear. COVID negative, precautions removed.   Output: Voiding adequate amounts w/o pain or difficulty    Last BM: 12/20    Activity: Up with SBA/Independent. Steady gait    Skin: Visible skin intact    Pain: Sore throat, improved after GI cocktail and tylenol in ED.    CMS: Intact    Dressing: None    Diet: Regular, tolerated eating soup and applesauce without any swallowing difficulties   LDA: PIV infusing    Equipment: IV pole   Plan: TBD   Additional Info: Hgb 5.1 in ED. 2 units of blood given, recheck at 0100. Second unit started at 2200. No signs of transfusion reaction.

## 2020-12-22 NOTE — DISCHARGE SUMMARY
Discharge Summary    Kajal Allen MRN# [id number]   YOB: 1980 Age: 40 year old     Date of Admission:  12/21/2020  Date of Discharge:  12/22/2020  Admitting Physician:  Sowmya Gonsales MD  Discharge Physician:  Shahzad Donaldson MD   Discharging Service:  Internal Medicine     Primary Provider: No Ref-Primary, Physician          Discharge Diagnosis:     Severe Iron deficiency anemia likely d/t menstrual blood loss                Brief History of Illness:     Kajal Allen is a 40 year old female who was admitted for severe anemaia    For more details, please refer to the admission H&P on 12/21/2020             Hospital Course:       40 year old female admitted on 12/21/2020. She has kn known PMH, presenting with severe weakness and fatigue, found to be severely anemic with Hgb of 5.4  She is being admitted as observation for transfusion of 2 units of PRBC and investigate other causes of low hgb        Severe Iron deficiency  Anemia:  Hgb on arrival at 5.4; Ferritin at 2 and Total iron at 7  Likely d/t menstrual blood loss  No hx of GI inflammation, melena, or any other inflammatory disease  Hemoglobin is 7.6 gm/dl. Patient feels improved from yesterday. Denies any lightheadedness or dizziness.   She is taking PO well, no hx of GI malabsorption. So, she will bed discharged from Ferrous sulfate 325 mg every other day (which has shown to have better absorption).   Also advised patient to follow up with OB/GYN and Primary care as outpatient. She could also follow up with Heme/Onc clinic, at this time likely cause is menstruation.           # Sore throat:  No obvious erythema of the posterior pharyngeal wall to my exam  No enlarged lymph nodes   Rapid strep negative  Consider outpatient ENT evaluation if pain is persistent            Diet:  Regular adult diet   DVT Prophylaxis: Low Risk/Ambulatory with no VTE prophylaxis indicated  Salazar Catheter: not present  Code Status:   Full      Discharge  "Medications:     Current Discharge Medication List      START taking these medications    Details   ferrous sulfate (FE TABS) 325 (65 Fe) MG EC tablet Take 1 tablet (325 mg) by mouth every other day  Qty: 300 tablet, Refills: 0    Associated Diagnoses: Anemia, unspecified type         STOP taking these medications       acetaminophen (TYLENOL) 325 MG tablet Comments:   Reason for Stopping:                   Procedures/Consultations:   No procedures performed during this admission  No consultations were requested during this admission           Final Day of Progress before Discharge:     Reports doing well  Feels improved  No nausea, vomiting, chest pain, shortness of breath  No lightheadedness or dizziness  No fever, chills.     Physical Exam:  Blood pressure 106/49, pulse 87, temperature 97.1  F (36.2  C), temperature source Oral, resp. rate 20, height 1.549 m (5' 1\"), weight 50.8 kg (112 lb), last menstrual period 12/12/2020, SpO2 100 %, not currently breastfeeding.      General: Laying in bed in no acute distress  HEENT: No icterus, no pallor  CVS/Chest: S1, S1 normal.   Respiratory/Chest: B/L CTA  GI/Abdomen/: Soft, NT, BS+  Extremities:  Others:    Data:  All laboratory data reviewed             Condition on Discharge:   Discharge condition: Stable   Code status on discharge: Full Code                Discharge Disposition:   Discharged to home               Pending Results:   Unresulted Labs Ordered in the Past 30 Days of this Admission     Date and Time Order Name Status Description    12/21/2020 1712 Blood Morphology Pathologist Review In process                   Discharge Instructions and Follow-Up:     Discharge Procedure Orders   Reason for your hospital stay   Order Comments: Admitted for severe anemia     Adult [facility] Follow-up and recommended labs and tests   Order Comments: Follow up with primary care provider, Physician No Ref-Primary, within 7 days for hospital follow- up.  The following " labs/tests are recommended: CBC  Follow up with Ob/Gyn in 7 days..      Appointments on [facility] and/or [facility] (with [facility] or [facility] provider or service). Call [phone] if you haven't heard regarding these appointments within 7 days of discharge.     Activity   Order Comments: Your activity upon discharge: activity as tolerated     Order Specific Question Answer Comments   Is discharge order? Yes      Monitor and record   Order Comments: Watch for any increased dizziness, lightheadedness, fatigue, shortness of breath, chest pain, signs of bleeding, increased menorrhagia. If these symptoms occurs, call your primary care or come to ED     Full Code     Order Specific Question Answer Comments   Code status determined by: Discussion with patient/ legal decision maker      Diet   Order Comments: Follow this diet upon discharge: Orders Placed This Encounter      Regular Diet Adult     Order Specific Question Answer Comments   Is discharge order? Yes           Attestation:  Shahzad Donaldson MD.    Time spent on patient: 55 minutes total including face to face and coordinating care time reviewing current illness, any medication changes, and the care plan for today.

## 2020-12-22 NOTE — PLAN OF CARE
"Patients vitals were stable this morning and CMS intact. Patient experienced throat pain and gagging when she tried to swallow two pills. Patient says the \"pills are stuck\". Applesauce was given with pills to aid swallowing which seemed to work for the time being. Applesauce or yogurt recommended to patient for taking discharge medication.    Pt. discharged at 1435 to home, was accompanied by her brother, and left with personal belongings. Pt. received complete discharge paperwork and ferrous sulfate medications as filled by discharge pharmacy. Pt. was given times of last dose for all discharge medications in writing on discharge medication sheets. Discharge teaching included medication administration instructions and follow up care recommendations. Pt. to follow up with primary care for CBC and ENT referral for chronic sore throat and OB/Gyn within 7 days. Pt. had no further questions at the time of discharge and no unmet needs were identified.   "

## 2020-12-22 NOTE — H&P
[facility]     History and Physical - Hospitalist Service       Date of Admission:  12/21/2020    Assessment & Plan   Kajal Allen is a 40 year old female admitted on 12/21/2020. She has kn known PMH, presenting with severe weakness and fatigue, found to be severely anemic with Hgb of 5.4  She is being admitted as observation for transfusion of 2 units of PRBC and investigate other causes of low hgb      Severe Iron deficiency  Anemia:  Hgb on arrival at 5.4. 2 units of PRBC ordered in the ER. Recheck Hgb at 1 am    low hematocrit at 21.9, MCV ar 62 and MCHC at 24.7,   Ferritin at 2 and Total iron at 7  Further more, given the low Albumin count, nutrition may be playing a role as well  Irom panel, peripheral smear, LDH ( normal), haptoglobin, reticulocyte count ordered  Will order B 12 and Folic acid as well  Patient could potentially follow up with hematology as outpatient  with these results  Also,  patient may benefit from iron infusion prior to discharge and discharge with ferrous sulphate supplements   RD consult ordered to determine nutritional status and advice accordingly   Patient may need to establish with PCP to discuss medications for heavy mensturation, which may be the main cause of severe iron deficiency       Sore throat:  No obvious erythema of the posterior pharyngeal wall to my exam  No enlarged lymph nodes   Rapid strep negative  Consider outpatient ENT evaluation if pain is persistent        Diet:  Regular adult diet   DVT Prophylaxis: Low Risk/Ambulatory with no VTE prophylaxis indicated  Salazar Catheter: not present  Code Status:   Full          Disposition Plan   Expected discharge: Tomorrow, recommended to prior living arrangement once symptoms resol;leona, completion of 2 units of transfusion and tests are resulted .  Entered: Ilda Barrios MD 12/21/2020, 6:01 PM     The patient's care was discussed with the Bedside Nurse and  Patient.    Ilda Barrios MD  [employer]   Contact information available via [facility] Paging/Directory      ______________________________________________________________________    Chief Complaint   Fatigue, weakness and soie throat     History is obtained from the patient and ER physician     History of Present Illness   Kajal Allen is a 40 year old female who presents to the emergency department for evaluation of sore throat, but more importantly generalized weakness and fatigue.  Patient is employed at the [employer] center as a  and does not have insurance.  She tells me that her symptoms of fatigue and weakness has been coming on for quite some time.  In addition to this she also mentions that she has had sore throat for almost about 6 months.  The sore throat comes and goes.  I did speak when it hurts, patient is unable to eat or drink much.    Denies any chest pain or shortness of breath.  She denies any fevers or chills.  She denies any nausea, vomiting or diarrhea.  She is unsure but she thinks that she may have lost her sense of taste and smell in the last few days.    Patient also adds that she has rather heavy menstrual cycles.  Each of them lasting for almost about 6 days, and she loses count of the number of pads she has to change.  At baseline she is otherwise healthy and does not have any medical problems.  She lives alone.  She does not have a primary care provider and nor is on any prescription medications at this time.  She has no history of recent travel and no significant family history of major diseases.    Review of Systems    The 10 point Review of Systems is negative other than noted in the HPI or here.     Past Medical History    I have reviewed this patient's medical history and updated it with pertinent information if needed.   History reviewed. No pertinent past medical history.    Past Surgical History   I have reviewed this  patient's surgical history and updated it with pertinent information if needed.  History reviewed. No pertinent surgical history.    Social History   I have reviewed this patient's social history and updated it with pertinent information if needed.  Social History     Tobacco Use     Smoking status: Never Smoker     Smokeless tobacco: Never Used   Substance Use Topics     Alcohol use: Not Currently     Drug use: Not Currently       Family History     Family history reviewed and non contributory    Prior to Admission Medications   Prior to Admission Medications   Prescriptions Last Dose Informant Patient Reported? Taking?   acetaminophen (TYLENOL) 325 MG tablet 12/20/2020  Yes Yes   Sig: Take 650 mg by mouth every 6 hours as needed for mild pain      Facility-Administered Medications: None     Allergies   No Known Allergies    Physical Exam   Vital Signs: Temp: 98.3  F (36.8  C) Temp src: Oral BP: 103/68 Pulse: 83   Resp: 18 SpO2: 100 % O2 Device: None (Room air)    Weight: 112 lbs 0 oz    Constitutional: awake, alert, cooperative, no apparent distress, and appears stated age  Eyes: Lids and lashes normal, pupils equal, round and reactive to light, extra ocular muscles intact, sclera clear, conjunctiva normal  ENT: Normocephalic, without obvious abnormality, atraumatic, sinuses nontender on palpation, external ears without lesions, oral pharynx with moist mucous membranes, tonsils without erythema or exudates, gums normal and good dentition.  Respiratory: No increased work of breathing, good air exchange, clear to auscultation bilaterally, no crackles or wheezing  Cardiovascular: Normal apical impulse, regular rate and rhythm, normal S1 and S2, no S3 or S4, and no murmur noted  GI: No scars, normal bowel sounds, soft, non-distended, non-tender, no masses palpated, no hepatosplenomegally  Skin: no bruising or bleeding  Musculoskeletal: no lower extremity pitting edema present  Neurologic: Awake, alert, oriented to  name, place and time.  Cranial nerves II-XII are grossly intact.  Motor is 5 out of 5 bilaterally.  Cerebellar finger to nose, heel to shin intact.  Sensory is intact.  Babinski down going, Romberg negative, and gait is normal.    Data   Data reviewed today: I reviewed all medications, new labs and imaging results over the last 24 hours. I personally reviewed the EKG tracing showing NSR. No ST or T wave changes .    Recent Labs   Lab 12/21/20  1708 12/21/20  1502   WBC  --  4.6   HGB  --  5.4*   MCV  --  62*   PLT  --  273   INR 1.23*  --    NA  --  136   POTASSIUM  --  4.1   CHLORIDE  --  105   CO2  --  25   BUN  --  5*   CR  --  0.38*   ANIONGAP  --  6   MENA  --  8.2*   GLC  --  93   ALBUMIN  --  3.3*   PROTTOTAL  --  9.9*   BILITOTAL  --  0.6   ALKPHOS  --  61   ALT  --  16   AST  --  33

## 2020-12-22 NOTE — ED NOTES
[facility]    ED Nurse to Floor Handoff     Kajal Allen is a 40 year old female who speaks English and lives with family members,  in a home  They arrived in the ED by car from home    ED Chief Complaint: Pharyngitis (Chronic intermittent sore throat x 6 months, patient clims its difficult to swallow, subjective fever)    ED Dx;   Final diagnoses:   Anemia, unspecified type   Sore throat         Needed?: No    Allergies: No Known Allergies.  Past Medical Hx: History reviewed. No pertinent past medical history.   Baseline Mental status: WDL  Current Mental Status changes: at basesline    Infection present or suspected this encounter: no  Sepsis suspected: No  Isolation type: Special Precautions-COVID-19  Patient tested for COVID 19 prior to admission: YES     Activity level - Baseline/Home:  Independent  Activity Level - Current:   Independent    Bariatric equipment needed?: No    In the ED these meds were given:   Medications   0.9% sodium chloride BOLUS (0 mLs Intravenous Stopped 12/21/20 1630)     Followed by   sodium chloride 0.9% infusion ( Intravenous New Bag 12/21/20 1832)   0.9% sodium chloride BOLUS (has no administration in time range)   sodium chloride 0.9 % infusion (has no administration in time range)   lidocaine (XYLOCAINE) 2 % 15 mL, alum & mag hydroxide-simethicone (MAALOX) 15 mL GI Cocktail (15 mLs Oral Given 12/21/20 1449)   acetaminophen (TYLENOL) tablet 1,000 mg (1,000 mg Oral Given 12/21/20 1449)       Drips running?  Yes    Home pump  No    Current LDAs  Peripheral IV 12/21/20 Left (Active)   Site Assessment WDL 12/21/20 1552   Line Status Infusing 12/21/20 1552   Number of days: 0       Labs results:   Labs Ordered and Resulted from Time of ED Arrival Up to the Time of Departure from the ED   COMPREHENSIVE METABOLIC PANEL - Abnormal; Notable for the following components:       Result Value    Urea Nitrogen 5 (*)     Creatinine 0.38 (*)      Calcium 8.2 (*)     Albumin 3.3 (*)     Protein Total 9.9 (*)     All other components within normal limits   CBC WITH PLATELETS DIFFERENTIAL - Abnormal; Notable for the following components:    RBC Count 3.51 (*)     Hemoglobin 5.4 (*)     Hematocrit 21.9 (*)     MCV 62 (*)     MCH 15.4 (*)     MCHC 24.7 (*)     RDW 23.0 (*)     Nucleated RBCs 1 (*)     Absolute Myelocytes 0.1 (*)     All other components within normal limits   ROUTINE UA WITH MICROSCOPIC - Abnormal; Notable for the following components:    RBC Urine 5 (*)     Bacteria Urine Few (*)     Squamous Epithelial /HPF Urine 6 (*)     sperm Present (*)     All other components within normal limits   IRON AND IRON BINDING CAPACITY - Abnormal; Notable for the following components:    Iron 7 (*)     Iron Saturation Index 2 (*)     All other components within normal limits   INR - Abnormal; Notable for the following components:    INR 1.23 (*)     All other components within normal limits   CBC WITH PLATELETS DIFFERENTIAL - Abnormal; Notable for the following components:    WBC 3.7 (*)     RBC Count 3.32 (*)     Hemoglobin 5.1 (*)     Hematocrit 20.7 (*)     MCV 62 (*)     MCH 15.4 (*)     MCHC 24.6 (*)     RDW 22.6 (*)     All other components within normal limits   FERRITIN - Abnormal; Notable for the following components:    Ferritin 2 (*)     All other components within normal limits   INFLUENZA A/B & SARS-COV2 PCR MULTIPLEX   HCG QUALITATIVE   TSH WITH FREE T4 REFLEX   TRANSFERRIN   LACTATE DEHYDROGENASE   HAPTOGLOBIN   BLOOD MORPHOLOGY PATHOLOGIST REVIEW   RETICULOCYTE COUNT   PARTIAL THROMBOPLASTIN TIME   FOLATE   PREALBUMIN   VITAMIN B12   ABO/RH TYPE AND SCREEN   RED BLOOD CELL PREPARE ORDER UNIT   BLOOD COMPONENT   BLOOD COMPONENT   STREPTOCOCCUS A RAPID SCR W REFLX TO PCR   GROUP A STREPTOCOCCUS PCR THROAT SWAB       Imaging Studies: No results found for this or any previous visit (from the past 24 hour(s)).    Recent vital signs:   /58    "Pulse 90   Temp 99.2  F (37.3  C)   Resp 18   Ht 1.549 m (5' 1\")   Wt 50.8 kg (112 lb)   LMP 12/12/2020   SpO2 100%   Breastfeeding No   BMI 21.16 kg/m      Ariana Coma Scale Score: 15 (12/21/20 1711)       Cardiac Rhythm: Normal Sinus  Pt needs tele? Yes  Skin/wound Issues: None    Code Status: Full Code    Pain control: good    Nausea control: good    Abnormal labs/tests/findings requiring intervention: hemoglobin 5.1    Family present during ED course? No   Family Comments/Social Situation comments:       Tasks needing completion: None    Amira Lugo, RN  [facility]   [phone] [facility]  [phone] [facility] ED    "

## 2020-12-22 NOTE — PLAN OF CARE
VS: Vital signs:  Temp: 97.4  F (36.3  C) Temp src: Oral BP: 105/65 Pulse: 76   Resp: 22 SpO2: 100 % O2 Device: None (Room air)     A&O x4.   O2: >90% on RA, denies SOB/CP   Output: Voiding spontaneously in bathroom   Last BM: 12/20/20, bowel sounds active and denies constipation or diarrhea   Activity: Independent but have been using SBA due to soft BP and pt has equipment that may be difficult to maneuver   Skin: Visible skin intact, pt refused full skin assessment but reported no concerns   Pain: Sore throat but has been improving, feeling very tired   CMS: Intact, no numbness or tingling   Dressing: None   Diet: Regular   LDA: PIV L forearm patent and no signs of infiltration or phlebitis. Infusing continuous potassium chloride in 5% dextrose and 0.9% saline at 100mL/hr   Equipment: IV pole   Plan: TBD   Additional Info: Hgb up to 7.6 after 2 units of blood

## 2020-12-22 NOTE — PROGRESS NOTES
Care Management Discharge Note    Discharge Date: 12/22/20       Discharge Disposition:  home    Discharge Services:  Self care    Discharge DME:  N/A    Discharge Transportation:  Brother will provide transport    Private pay costs discussed: Patient has filled out MA letty with finanicial    PAS Confirmation Code:    Patient/family educated on Medicare website which has current facility and service quality ratings:  N/A    Education Provided on the Discharge Plan:  yes  Persons Notified of Discharge Plans:patient  Patient/Family in Agreement with the Plan:  Yes    Handoff Referral Completed: No-patient does not have a primary PCP    Additional Information:  This RNCC met with patient at bedside to discuss insurance issues, patient states she has already filled out MA letty and will be selecting a PCP when her MA becomes effective. Patient denied any significant concerns related to discharge, waiting for brother to pick her up.        Nikki PINEDAN RN CCM  RN Care Coordinator [address] [place] [id number]  [email or web address]   Office: [phone]   Pager: [phone]    To contact Weekend RNCC, dial [phone] and enter job code [phone] at prompt. This pager can not be contacted by text page or outside line.

## 2020-12-22 NOTE — ED NOTES
Report given to Héctor DE LA ROSA, RN made aware blood has started. RN will be receiving patient after the initial 15 minutes.   Pt has no distress, will continue to monitor patient.

## 2020-12-24 NOTE — PROGRESS NOTES
Attempted to contact the patient x3 for post-hospital call without answer. Will close encounter at this time.    Maria Teresa Harry CMA, [facility]  Post Hospital Discharge Team

## 2024-01-19 NOTE — ED NOTES
"     Emergency Department Patient Sign-out       Brief HPI:  This is a 40 year old female signed out to me by Dr. Martinez .  See initial ED Provider note for details of the presentation.            Significant Events prior to my assuming care: The patient is a 40-year-old female who presented initially for evaluation of a sore throat for the past 6 months.  The patient also noted subjective fevers for the past day.  Patient was found to have hemoglobin of 5.4.  Patient is admitted to internal medicine and 2 units of PRBCs have been ordered.  Patient does not report any chest pain or shortness of breath.  EKG was ordered due to low hemoglobin.      Exam:   Patient Vitals for the past 24 hrs:   BP Temp Temp src Pulse Resp SpO2 Height Weight   12/21/20 1820 -- -- -- -- -- 100 % -- --   12/21/20 1810 -- -- -- -- -- 97 % -- --   12/21/20 1750 -- -- -- -- -- 100 % -- --   12/21/20 1740 -- -- -- -- -- 100 % -- --   12/21/20 1730 -- -- -- -- -- 100 % -- --   12/21/20 1711 103/68 98.3  F (36.8  C) Oral 83 18 100 % -- --   12/21/20 1358 120/74 97.7  F (36.5  C) Oral 104 16 97 % 1.549 m (5' 1\") 50.8 kg (112 lb)           ED RESULTS:   Results for orders placed or performed during the hospital encounter of 12/21/20 (from the past 24 hour(s))   Streptococcus A Rapid Scr w Reflx to PCR     Status: None    Collection Time: 12/21/20  2:38 PM    Specimen: Throat   Result Value Ref Range    Strep Specimen Description Throat     Streptococcus Group A Rapid Screen Negative NEG^Negative   Group A Streptococcus PCR Throat Swab     Status: None    Collection Time: 12/21/20  2:38 PM    Specimen: Throat   Result Value Ref Range    Specimen Description Throat     Strep Group A PCR Not Detected NDET^Not Detected   Symptomatic Influenza A/B & SARS-CoV2 (COVID-19) Virus PCR Multiplex     Status: None    Collection Time: 12/21/20  2:39 PM    Specimen: Nasopharyngeal   Result Value Ref Range    Flu A/B & SARS-COV-2 PCR Source Nasopharyngeal     " I am reaching out regarding your appointment with Shanna Helton on 2/5/24.    There has been a change to the providers schedule, and we will need to reschedule your appointment.    I left a voicemail explaining to patient that this appointment will need to be rescheduled due to a change in the providers schedule. Patient was advised to call [facility] [phone] to reschedule.     SARS-CoV-2 PCR Result NEGATIVE     Influenza A PCR Negative NEG^Negative    Influenza B PCR Negative NEG^Negative    Respiratory Syncytial Virus PCR (Note)     Flu A/B & SARS-CoV-2 PCR Comment (Note)    Comprehensive metabolic panel     Status: Abnormal    Collection Time: 12/21/20  3:02 PM   Result Value Ref Range    Sodium 136 133 - 144 mmol/L    Potassium 4.1 3.4 - 5.3 mmol/L    Chloride 105 94 - 109 mmol/L    Carbon Dioxide 25 20 - 32 mmol/L    Anion Gap 6 3 - 14 mmol/L    Glucose 93 70 - 99 mg/dL    Urea Nitrogen 5 (L) 7 - 30 mg/dL    Creatinine 0.38 (L) 0.52 - 1.04 mg/dL    GFR Estimate >90 >60 mL/min/[1.73_m2]    GFR Estimate If Black >90 >60 mL/min/[1.73_m2]    Calcium 8.2 (L) 8.5 - 10.1 mg/dL    Bilirubin Total 0.6 0.2 - 1.3 mg/dL    Albumin 3.3 (L) 3.4 - 5.0 g/dL    Protein Total 9.9 (H) 6.8 - 8.8 g/dL    Alkaline Phosphatase 61 40 - 150 U/L    ALT 16 0 - 50 U/L    AST 33 0 - 45 U/L   TSH with free T4 reflex     Status: None    Collection Time: 12/21/20  3:02 PM   Result Value Ref Range    TSH 1.43 0.40 - 4.00 mU/L   CBC with platelets differential     Status: Abnormal    Collection Time: 12/21/20  3:02 PM   Result Value Ref Range    WBC 4.6 4.0 - 11.0 10e9/L    RBC Count 3.51 (L) 3.8 - 5.2 10e12/L    Hemoglobin 5.4 (LL) 11.7 - 15.7 g/dL    Hematocrit 21.9 (L) 35.0 - 47.0 %    MCV 62 (L) 78 - 100 fl    MCH 15.4 (L) 26.5 - 33.0 pg    MCHC 24.7 (L) 31.5 - 36.5 g/dL    RDW 23.0 (H) 10.0 - 15.0 %    Platelet Count 273 150 - 450 10e9/L    Diff Method Manual Differential     % Neutrophils 42.6 %    % Lymphocytes 44.4 %    % Monocytes 8.7 %    % Eosinophils 0.9 %    % Basophils 1.7 %    % Myelocytes 1.7 %    Nucleated RBCs 1 (H) 0 /100    Absolute Neutrophil 2.0 1.6 - 8.3 10e9/L    Absolute Lymphocytes 2.0 0.8 - 5.3 10e9/L    Absolute Monocytes 0.4 0.0 - 1.3 10e9/L    Absolute Eosinophils 0.0 0.0 - 0.7 10e9/L    Absolute Basophils 0.1 0.0 - 0.2 10e9/L    Absolute Myelocytes 0.1 (H) 0 10e9/L    Absolute Nucleated  RBC 0.0     Anisocytosis Moderate     Poikilocytosis Moderate     RBC Fragments Slight     Teardrop Cells Moderate     Elliptocytes Moderate     Microcytes Present     Hypochromasia Present     Platelet Estimate Confirming automated cell count    HCG qualitative Blood     Status: None    Collection Time: 12/21/20  3:27 PM   Result Value Ref Range    HCG Qualitative Serum Negative NEG^Negative   ABO/Rh type and screen     Status: None    Collection Time: 12/21/20  5:08 PM   Result Value Ref Range    Units Ordered 2     ABO O     RH(D) Pos     Antibody Screen Neg     Test Valid Only At          [facility]    Specimen Expires 12/24/2020     Crossmatch Red Blood Cells    Iron and iron binding capacity     Status: Abnormal    Collection Time: 12/21/20  5:08 PM   Result Value Ref Range    Iron 7 (L) 35 - 180 ug/dL    Iron Binding Cap 371 240 - 430 ug/dL    Iron Saturation Index 2 (L) 15 - 46 %   Lactate Dehydrogenase     Status: None    Collection Time: 12/21/20  5:08 PM   Result Value Ref Range    Lactate Dehydrogenase 156 81 - 234 U/L   Reticulocyte Count     Status: None    Collection Time: 12/21/20  5:08 PM   Result Value Ref Range    % Retic 1.2 0.5 - 2.0 %    Absolute Retic 39.8 25 - 95 10e9/L   INR     Status: Abnormal    Collection Time: 12/21/20  5:08 PM   Result Value Ref Range    INR 1.23 (H) 0.86 - 1.14   Partial thromboplastin time     Status: None    Collection Time: 12/21/20  5:08 PM   Result Value Ref Range    PTT 33 22 - 37 sec   CBC with platelets differential     Status: Abnormal    Collection Time: 12/21/20  5:08 PM   Result Value Ref Range    WBC 3.7 (L) 4.0 - 11.0 10e9/L    RBC Count 3.32 (L) 3.8 - 5.2 10e12/L    Hemoglobin 5.1 (LL) 11.7 - 15.7 g/dL    Hematocrit 20.7 (L) 35.0 - 47.0 %    MCV 62 (L) 78 - 100 fl    MCH 15.4 (L) 26.5 - 33.0 pg    MCHC 24.6 (L) 31.5 - 36.5 g/dL    RDW 22.6 (H) 10.0 - 15.0 %    Platelet Count 320 150 - 450 10e9/L    Diff  Method Automated Method     % Neutrophils 43.0 %    % Lymphocytes 44.0 %    % Monocytes 11.0 %    % Eosinophils 2.0 %    Absolute Neutrophil 1.6 1.6 - 8.3 10e9/L    Absolute Lymphocytes 1.6 0.8 - 5.3 10e9/L    Absolute Monocytes 0.4 0.0 - 1.3 10e9/L    Absolute Eosinophils 0.1 0.0 - 0.7 10e9/L   Ferritin     Status: Abnormal    Collection Time: 12/21/20  5:08 PM   Result Value Ref Range    Ferritin 2 (L) 12 - 150 ng/mL   Blood component     Status: None    Collection Time: 12/21/20  5:08 PM   Result Value Ref Range    Unit Number [id number]     Blood Component Type Red Blood Cells Leukocyte Reduced     Division Number 00     Status of Unit Released to care unit 12/21/2020 1822     Blood Product Code [id number]     Unit Status ISS    Blood component     Status: None    Collection Time: 12/21/20  5:08 PM   Result Value Ref Range    Unit Number [id number]     Blood Component Type Red Blood Cells Leukocyte Reduced     Division Number 00     Status of Unit Ready for patient 12/21/2020 1820     Blood Product Code [id number]     Unit Status [facility]ESIS    EKG 12 lead     Status: None (Preliminary result)    Collection Time: 12/21/20  5:44 PM   Result Value Ref Range    Interpretation ECG Click View Image link to view waveform and result    UA with Microscopic     Status: Abnormal    Collection Time: 12/21/20  5:48 PM   Result Value Ref Range    Color Urine Light Yellow     Appearance Urine Clear     Glucose Urine Negative NEG^Negative mg/dL    Bilirubin Urine Negative NEG^Negative    Ketones Urine Negative NEG^Negative mg/dL    Specific Gravity Urine 1.004 1.003 - 1.035    Blood Urine Negative NEG^Negative    pH Urine 5.5 5.0 - 7.0 pH    Protein Albumin Urine Negative NEG^Negative mg/dL    Urobilinogen mg/dL Normal 0.0 - 2.0 mg/dL    Nitrite Urine Negative NEG^Negative    Leukocyte Esterase Urine Negative NEG^Negative    Source Midstream Urine     WBC Urine 1 0 - 5 /HPF    RBC Urine 5 (H) 0 - 2 /HPF    Bacteria Urine Few (A)  NEG^Negative /HPF    Squamous Epithelial /HPF Urine 6 (H) 0 - 1 /HPF    sperm Present (A) NEG^Negative /HPF       ED MEDICATIONS:   Medications   0.9% sodium chloride BOLUS (0 mLs Intravenous Stopped 12/21/20 1630)     Followed by   sodium chloride 0.9% infusion ( Intravenous New Bag 12/21/20 1832)   0.9% sodium chloride BOLUS (has no administration in time range)   lidocaine (XYLOCAINE) 2 % 15 mL, alum & mag hydroxide-simethicone (MAALOX) 15 mL GI Cocktail (15 mLs Oral Given 12/21/20 1449)   acetaminophen (TYLENOL) tablet 1,000 mg (1,000 mg Oral Given 12/21/20 1449)         Impression:    ICD-10-CM    1. Anemia, unspecified type  D64.9 Symptomatic Influenza A/B & SARS-CoV2 (COVID-19) Virus PCR Multiplex     HCG qualitative Blood     UA with Microscopic     Group A Streptococcus PCR Throat Swab     ABO/Rh type and screen     Iron and iron binding capacity     Transferrin     Lactate Dehydrogenase     Haptoglobin     Reticulocyte Count     INR     INR     Partial thromboplastin time     Partial thromboplastin time     CBC with platelets differential     CBC with platelets differential     Ferritin     Ferritin     Folate     Folate     Prealbumin     Prealbumin     Vitamin B12     Vitamin B12     Blood component     Blood component     Blood component     Blood component     CANCELED: INR     CANCELED: Partial thromboplastin time     CANCELED: HCG qualitative Blood     CANCELED: Iron     CANCELED: Ferritin     CANCELED: Prealbumin     CANCELED: Vitamin B12     CANCELED: Folate   2. Sore throat  J02.9        Plan:      2 units of PRBCs were ordered by Dr. Martinez.  I discussed the risks and benefits of blood transfusion with the patient, who consents to receive blood products.  Consent form was signed.         EKG Interpretation:      Interpreted by Cathleen Otoole MD  Time reviewed:1800   Symptoms at time of EKG: none, low Hgb   Rhythm: normal sinus   Rate: normal, 80 bpm   Axis: NORMAL  Ectopy:  none  Conduction: normal  ST Segments/ T Waves: No ST-T wave changes  Q Waves: none  Comparison to prior: No old EKG available    Clinical Impression: normal EKG    Patient admitted to internal medicine.    MD Sydnie Han Michelle C, MD  12/21/20 [id number]